# Patient Record
Sex: MALE | Race: BLACK OR AFRICAN AMERICAN | NOT HISPANIC OR LATINO | Employment: OTHER | URBAN - METROPOLITAN AREA
[De-identification: names, ages, dates, MRNs, and addresses within clinical notes are randomized per-mention and may not be internally consistent; named-entity substitution may affect disease eponyms.]

---

## 2019-05-14 PROBLEM — F32.A DEPRESSION: Status: ACTIVE | Noted: 2018-08-28

## 2021-07-09 PROBLEM — Z71.6 TOBACCO ABUSE COUNSELING: Status: ACTIVE | Noted: 2021-07-09

## 2021-07-09 PROBLEM — Z87.891 PERSONAL HISTORY OF TOBACCO USE, PRESENTING HAZARDS TO HEALTH: Status: ACTIVE | Noted: 2021-07-09

## 2021-08-09 PROBLEM — F17.200 TOBACCO USE DISORDER: Status: ACTIVE | Noted: 2021-07-09

## 2022-03-20 PROBLEM — R07.9 CHEST PAIN: Status: ACTIVE | Noted: 2022-03-20

## 2022-03-20 PROBLEM — J44.9 CHRONIC OBSTRUCTIVE PULMONARY DISEASE: Status: ACTIVE | Noted: 2022-03-20

## 2022-11-21 PROBLEM — Z79.899 LONG-TERM USE OF HIGH-RISK MEDICATION: Status: ACTIVE | Noted: 2022-11-21

## 2023-03-03 PROBLEM — N18.31 STAGE 3A CHRONIC KIDNEY DISEASE: Status: ACTIVE | Noted: 2023-03-03

## 2023-03-03 PROBLEM — I70.0 AORTIC ATHEROSCLEROSIS: Status: ACTIVE | Noted: 2023-03-03

## 2023-03-23 ENCOUNTER — CLINICAL SUPPORT (OUTPATIENT)
Dept: SMOKING CESSATION | Facility: CLINIC | Age: 73
End: 2023-03-23
Payer: COMMERCIAL

## 2023-03-23 DIAGNOSIS — F17.200 NICOTINE DEPENDENCE: Primary | ICD-10-CM

## 2023-03-23 PROCEDURE — 99404 PREV MED CNSL INDIV APPRX 60: CPT | Mod: S$GLB,,, | Performed by: GENERAL PRACTICE

## 2023-03-23 PROCEDURE — 99404 PR PREVENT COUNSEL,INDIV,60 MIN: ICD-10-PCS | Mod: S$GLB,,, | Performed by: GENERAL PRACTICE

## 2023-03-23 RX ORDER — DM/P-EPHED/ACETAMINOPH/DOXYLAM 30-7.5/3
2 LIQUID (ML) ORAL
Qty: 96 LOZENGE | Refills: 0 | Status: SHIPPED | OUTPATIENT
Start: 2023-03-23 | End: 2023-05-15 | Stop reason: ALTCHOICE

## 2023-04-18 PROBLEM — D17.0 LIPOMA OF FACE: Status: ACTIVE | Noted: 2023-04-18

## 2023-04-20 ENCOUNTER — CLINICAL SUPPORT (OUTPATIENT)
Dept: SMOKING CESSATION | Facility: CLINIC | Age: 73
End: 2023-04-20
Payer: COMMERCIAL

## 2023-04-20 DIAGNOSIS — F17.200 NICOTINE DEPENDENCE: Primary | ICD-10-CM

## 2023-04-20 PROCEDURE — 99402 PR PREVENT COUNSEL,INDIV,30 MIN: ICD-10-PCS | Mod: S$GLB,,, | Performed by: GENERAL PRACTICE

## 2023-04-20 PROCEDURE — 99402 PREV MED CNSL INDIV APPRX 30: CPT | Mod: S$GLB,,, | Performed by: GENERAL PRACTICE

## 2023-04-20 NOTE — PROGRESS NOTES
Individual Follow-Up Form    4/20/2023    Quit Date: 4/5/23    Clinical Status of Patient: Outpatient    Length of Service: 30 minutes    Continuing Medication: yes  Nicotine Lozenges    Other Medications: none     Target Symptoms: Withdrawal and medication side effects. The following were  rated moderate (3) to severe (4) on TCRS:  Moderate (3): none  Severe (4): none    Comments: Followed up with patient in clinic. Patient has quit smoking.  orientation, client introductions, completion of TCRS (Tobacco Cessation Rating Scale) learned addiction model, cues/triggers, personal reasons for quitting, medications, goals, quit date. The patient will continue with  therapy sessions and medication monitoring by CTTS. Prescribed medication management will be by physician. The patient denies any abnormal behavioral or mental changes at this time.      Diagnosis: F17.210    Next Visit: 2 weeks

## 2023-05-10 ENCOUNTER — PATIENT MESSAGE (OUTPATIENT)
Dept: ADMINISTRATIVE | Facility: CLINIC | Age: 73
End: 2023-05-10
Payer: MEDICARE

## 2023-05-10 ENCOUNTER — EXTERNAL HOSPITAL ADMISSION (OUTPATIENT)
Dept: ADMINISTRATIVE | Facility: CLINIC | Age: 73
End: 2023-05-10
Payer: MEDICARE

## 2023-05-10 ENCOUNTER — PATIENT OUTREACH (OUTPATIENT)
Dept: ADMINISTRATIVE | Facility: CLINIC | Age: 73
End: 2023-05-10
Payer: MEDICARE

## 2023-05-11 NOTE — PROGRESS NOTES
C3 nurse attempted to contact [facility] for a TCC post hospital discharge follow up call. No answer. No voicemail available.The patient does not have a scheduled HOSFU appointment. Message previously sent to PCP staff for assistance with scheduling visit with patient. Patient is scheduled for  Lipoma excision on 5/17/23, but no HOSFU seen in chart. Patient has appointment with Raheem Petersen DO (psych) 5/15/23 @ 10:00am.

## 2023-05-12 NOTE — PROGRESS NOTES
3rd attempt-C3 nurse attempted to contact [facility] for a TCC post hospital discharge follow up call. No answer. No voicemail available.The patient does not have a scheduled HOSFU appointment. Message previously sent to PCP staff for assistance with scheduling visit with patient. Patient is scheduled for  Lipoma excision on 5/17/23, but no HOSFU seen in chart. Patient has appointment with Raheem Petersen DO (psych) 5/15/23 @ 10:00am.

## 2023-05-22 ENCOUNTER — TELEPHONE (OUTPATIENT)
Dept: SMOKING CESSATION | Facility: CLINIC | Age: 73
End: 2023-05-22
Payer: MEDICARE

## 2023-06-15 ENCOUNTER — TELEPHONE (OUTPATIENT)
Dept: SMOKING CESSATION | Facility: CLINIC | Age: 73
End: 2023-06-15
Payer: MEDICARE

## 2023-11-20 ENCOUNTER — SOCIAL WORK (OUTPATIENT)
Dept: ADMINISTRATIVE | Facility: OTHER | Age: 73
End: 2023-11-20
Payer: MEDICARE

## 2023-11-20 NOTE — PROGRESS NOTES
SW received consult for pt regarding the need for home health. SW placed a call to pt @ [phone] to provide assistance. Pt states he is wanting home health services and does not have a preference. SW scanned and emailed home health referral to Larisa shields/ [facility] for review. SW following.    BARI Pitt    [phone] (phone)  [phone] (fax)

## 2023-11-21 ENCOUNTER — SOCIAL WORK (OUTPATIENT)
Dept: ADMINISTRATIVE | Facility: OTHER | Age: 73
End: 2023-11-21
Payer: MEDICARE

## 2023-11-21 NOTE — PROGRESS NOTES
SW placed a follow up phone call to Larisa shields/ [facility]. Per Larisa, pt has been approved by insurance and first initial home visit is scheduled for today. No other needs at this time.     BARI Pitt    [phone] (phone)  [phone] (fax)

## 2023-12-30 PROBLEM — I16.0 HYPERTENSIVE URGENCY: Status: ACTIVE | Noted: 2023-12-30

## 2023-12-30 PROBLEM — E87.6 HYPOKALEMIA: Status: ACTIVE | Noted: 2023-12-30

## 2023-12-30 PROBLEM — D53.9 MACROCYTIC ANEMIA: Status: ACTIVE | Noted: 2023-12-30
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
## 2024-02-28 PROBLEM — M19.90 ARTHRITIS: Status: ACTIVE | Noted: 2024-02-28

## 2024-03-01 PROBLEM — R07.9 CHEST PAIN: Status: RESOLVED | Noted: 2022-03-20 | Resolved: 2024-03-01

## 2024-03-07 ENCOUNTER — TELEPHONE (OUTPATIENT)
Dept: SMOKING CESSATION | Facility: CLINIC | Age: 74
End: 2024-03-07
Payer: MEDICARE

## 2024-08-28 PROBLEM — F25.9 SCHIZOAFFECTIVE DISORDER: Status: ACTIVE | Noted: 2024-08-28

## 2024-10-22 PROBLEM — G91.2 NORMAL PRESSURE HYDROCEPHALUS: Status: ACTIVE | Noted: 2024-10-22

## 2024-10-22 PROBLEM — R41.82 ALTERED MENTAL STATUS: Status: ACTIVE | Noted: 2024-10-22

## 2024-10-22 PROBLEM — E87.0 HYPERNATREMIA: Status: ACTIVE | Noted: 2024-10-22

## 2024-10-23 PROBLEM — N17.9 AKI (ACUTE KIDNEY INJURY): Status: ACTIVE | Noted: 2024-10-23

## 2024-10-23 PROBLEM — A41.9 SEPSIS: Status: ACTIVE | Noted: 2024-10-23

## 2024-10-23 PROBLEM — T76.01XA SUSPECTED ELDER NEGLECT: Status: ACTIVE | Noted: 2024-10-23

## 2024-10-23 PROBLEM — L97.902 ULCER OF LOWER EXTREMITY WITH FAT LAYER EXPOSED: Status: ACTIVE | Noted: 2024-10-23

## 2024-10-23 PROBLEM — D69.6 THROMBOCYTOPENIA: Status: ACTIVE | Noted: 2024-10-23

## 2024-10-24 PROBLEM — D65 DIC (DISSEMINATED INTRAVASCULAR COAGULATION): Status: ACTIVE | Noted: 2024-10-24

## 2024-10-25 PROBLEM — A53.9 SERUM POSITIVE FOR TREPONEMA PALLIDUM BY PCR: Status: ACTIVE | Noted: 2024-10-25

## 2024-10-25 PROBLEM — A53.9 SERUM POSITIVE FOR TREPONEMA PALLIDUM BY PCR: Status: RESOLVED | Noted: 2024-10-25 | Resolved: 2024-10-25

## 2024-10-26 PROBLEM — D64.9 ACUTE ANEMIA: Status: ACTIVE | Noted: 2024-10-26

## 2024-10-29 PROBLEM — N17.9 AKI (ACUTE KIDNEY INJURY): Status: RESOLVED | Noted: 2024-10-23 | Resolved: 2024-10-29

## 2024-10-29 PROBLEM — E87.0 HYPERNATREMIA: Status: RESOLVED | Noted: 2024-10-22 | Resolved: 2024-10-29

## 2024-10-31 PROBLEM — R14.0 GASEOUS ABDOMINAL DISTENTION: Status: ACTIVE | Noted: 2024-10-31

## 2024-10-31 PROBLEM — D69.6 THROMBOCYTOPENIA: Status: RESOLVED | Noted: 2024-10-23 | Resolved: 2024-10-31

## 2024-10-31 PROBLEM — D65 DIC (DISSEMINATED INTRAVASCULAR COAGULATION): Status: RESOLVED | Noted: 2024-10-24 | Resolved: 2024-10-31

## 2024-11-01 PROBLEM — K59.00 CONSTIPATION: Status: ACTIVE | Noted: 2024-11-01

## 2024-11-01 PROBLEM — E46 PROTEIN MALNUTRITION: Status: ACTIVE | Noted: 2024-11-01

## 2024-11-01 PROBLEM — E83.39 HYPOPHOSPHATEMIA: Status: ACTIVE | Noted: 2024-11-01

## 2024-11-04 PROBLEM — J69.0 ASPIRATION PNEUMONIA OF LEFT LUNG: Status: ACTIVE | Noted: 2024-11-04

## 2024-11-05 PROBLEM — Z51.5 COMFORT MEASURES ONLY STATUS: Status: ACTIVE | Noted: 2024-11-05

## 2024-11-05 PROBLEM — R74.01 TRANSAMINITIS: Status: ACTIVE | Noted: 2024-11-05

## 2024-11-08 ENCOUNTER — PATIENT OUTREACH (OUTPATIENT)
Dept: ADMINISTRATIVE | Facility: CLINIC | Age: 74
End: 2024-11-08
Payer: MEDICARE